# Patient Record
Sex: FEMALE | Race: WHITE
[De-identification: names, ages, dates, MRNs, and addresses within clinical notes are randomized per-mention and may not be internally consistent; named-entity substitution may affect disease eponyms.]

---

## 2018-01-06 ENCOUNTER — HOSPITAL ENCOUNTER (EMERGENCY)
Dept: HOSPITAL 75 - ER | Age: 28
Discharge: HOME | End: 2018-01-06
Payer: SELF-PAY

## 2018-01-06 VITALS — SYSTOLIC BLOOD PRESSURE: 124 MMHG | DIASTOLIC BLOOD PRESSURE: 67 MMHG

## 2018-01-06 VITALS — BODY MASS INDEX: 25.73 KG/M2 | HEIGHT: 66 IN | WEIGHT: 160.13 LBS

## 2018-01-06 DIAGNOSIS — O99.89: Primary | ICD-10-CM

## 2018-01-06 DIAGNOSIS — N89.8: ICD-10-CM

## 2018-01-06 DIAGNOSIS — Z3A.14: ICD-10-CM

## 2018-01-06 LAB
ALBUMIN SERPL-MCNC: 3.6 GM/DL (ref 3.2–4.5)
ALP SERPL-CCNC: 61 U/L (ref 40–136)
ALT SERPL-CCNC: 16 U/L (ref 0–55)
AMORPH SED URNS QL MICRO: (no result) /LPF
APTT PPP: YELLOW S
BACTERIA #/AREA URNS HPF: (no result) /HPF
BARBITURATES UR QL: NEGATIVE
BASOPHILS # BLD AUTO: 0 10^3/UL (ref 0–0.1)
BASOPHILS NFR BLD AUTO: 0 % (ref 0–10)
BENZODIAZ UR QL SCN: NEGATIVE
BILIRUB SERPL-MCNC: 0.3 MG/DL (ref 0.1–1)
BILIRUB UR QL STRIP: NEGATIVE
BUN/CREAT SERPL: 12
CALCIUM SERPL-MCNC: 9.2 MG/DL (ref 8.5–10.1)
CHLORIDE SERPL-SCNC: 109 MMOL/L (ref 98–107)
CO2 SERPL-SCNC: 18 MMOL/L (ref 21–32)
COCAINE UR QL: NEGATIVE
CREAT SERPL-MCNC: 0.75 MG/DL (ref 0.6–1.3)
EOSINOPHIL # BLD AUTO: 0 10^3/UL (ref 0–0.3)
EOSINOPHIL NFR BLD AUTO: 0 % (ref 0–10)
ERYTHROCYTE [DISTWIDTH] IN BLOOD BY AUTOMATED COUNT: 12.6 % (ref 10–14.5)
FIBRINOGEN PPP-MCNC: (no result) MG/DL
GFR SERPLBLD BASED ON 1.73 SQ M-ARVRAT: > 60 ML/MIN
GLUCOSE SERPL-MCNC: 91 MG/DL (ref 70–105)
GLUCOSE UR STRIP-MCNC: NEGATIVE MG/DL
HCT VFR BLD CALC: 32 % (ref 35–52)
HGB BLD-MCNC: 11.7 G/DL (ref 11.5–16)
KETONES UR QL STRIP: NEGATIVE
LEUKOCYTE ESTERASE UR QL STRIP: (no result)
LYMPHOCYTES # BLD AUTO: 1.7 X 10^3 (ref 1–4)
LYMPHOCYTES NFR BLD AUTO: 17 % (ref 12–44)
MANUAL DIFFERENTIAL PERFORMED BLD QL: NO
MCH RBC QN AUTO: 32 PG (ref 25–34)
MCHC RBC AUTO-ENTMCNC: 36 G/DL (ref 32–36)
MCV RBC AUTO: 88 FL (ref 80–99)
METHADONE UR QL SCN: NEGATIVE
METHAMPHETAMINE SCREEN URINE S: NEGATIVE
MONOCYTES # BLD AUTO: 0.5 X 10^3 (ref 0–1)
MONOCYTES NFR BLD AUTO: 5 % (ref 0–12)
NEUTROPHILS # BLD AUTO: 7.5 X 10^3 (ref 1.8–7.8)
NEUTROPHILS NFR BLD AUTO: 77 % (ref 42–75)
NITRITE UR QL STRIP: NEGATIVE
OPIATES UR QL SCN: NEGATIVE
OXYCODONE UR QL: NEGATIVE
PH UR STRIP: 8 [PH] (ref 5–9)
PLATELET # BLD: 332 10^3/UL (ref 130–400)
PMV BLD AUTO: 9 FL (ref 7.4–10.4)
POTASSIUM SERPL-SCNC: 4.1 MMOL/L (ref 3.6–5)
PROPOXYPH UR QL: NEGATIVE
PROT SERPL-MCNC: 6.5 GM/DL (ref 6.4–8.2)
PROT UR QL STRIP: NEGATIVE
RBC # BLD AUTO: 3.69 10^6/UL (ref 4.35–5.85)
RBC #/AREA URNS HPF: (no result) /HPF
SODIUM SERPL-SCNC: 138 MMOL/L (ref 135–145)
SP GR UR STRIP: 1.01 (ref 1.02–1.02)
SQUAMOUS #/AREA URNS HPF: (no result) /HPF
TRICYCLICS UR QL SCN: NEGATIVE
UROBILINOGEN UR-MCNC: NORMAL MG/DL
WBC # BLD AUTO: 9.7 10^3/UL (ref 4.3–11)
WBC #/AREA URNS HPF: (no result) /HPF

## 2018-01-06 PROCEDURE — 84702 CHORIONIC GONADOTROPIN TEST: CPT

## 2018-01-06 PROCEDURE — 87070 CULTURE OTHR SPECIMN AEROBIC: CPT

## 2018-01-06 PROCEDURE — 85025 COMPLETE CBC W/AUTO DIFF WBC: CPT

## 2018-01-06 PROCEDURE — 87491 CHLMYD TRACH DNA AMP PROBE: CPT

## 2018-01-06 PROCEDURE — 81000 URINALYSIS NONAUTO W/SCOPE: CPT

## 2018-01-06 PROCEDURE — 80053 COMPREHEN METABOLIC PANEL: CPT

## 2018-01-06 PROCEDURE — 80306 DRUG TEST PRSMV INSTRMNT: CPT

## 2018-01-06 PROCEDURE — 87591 N.GONORRHOEAE DNA AMP PROB: CPT

## 2018-01-06 PROCEDURE — 36415 COLL VENOUS BLD VENIPUNCTURE: CPT

## 2018-01-06 PROCEDURE — 99284 EMERGENCY DEPT VISIT MOD MDM: CPT

## 2018-01-06 PROCEDURE — 87210 SMEAR WET MOUNT SALINE/INK: CPT

## 2018-01-06 NOTE — ED GU-FEMALE
General


Chief Complaint:  -Female


Stated Complaint:  14 WKS PREG, DIZZY AND CRAMPS


Nursing Triage Note:  


states having yellow vaginal discharge and feels like she is unable to empty 


bladder times 2 days.  states pain was so bad today she "passed out" for a few 


minutes


Nursing Sepsis Screen:  No Definite Risk


Source:  patient


Exam Limitations:  no limitations





History of Present Illness


Time seen by provider:  17:40


Initial Comments


To ER with reports of being dizzy, abdominal cramping, nausea, urinary 

frequency but only dribbling a small amount when she goes. She is 14 weeks 

gestation.  Ab1. Denies vaginal bleeding. She also complains of a whitish 

yellow vaginal discharge


Timing/Duration:  constant, yesterday


Severity/Quality:  cramping


Location:  suprapubic


Activities at Onset:  none


Associated Symptoms:  dysuria





Allergies and Home Medications


Allergies


Coded Allergies:  


     No Known Drug Allergies (Unverified , 09)





Home Medications


Ferrous Sulfate 325 Mg Tablet, 325 MG PO DAILY, (Reported)


Ibuprofen 600 Mg Tab, 600 MG PO Q6H PRN, (Reported)


Prenatal Vits W-Ca,Fe,Fa(<1MG) 1 Each Tablet, 1 TAB PO DAILY, (Reported)





Constitutional:  see HPI


EENTM:  see HPI


Respiratory:  no symptoms reported


Cardiovascular:  no symptoms reported


Genitourinary:  see HPI


Musculoskeletal:  no symptoms reported


Skin:  no symptoms reported





Past Medical-Social-Family Hx


Patient Social History


Alcohol Use:  Denies Use


Recreational Drug Use:  No


Smoking Status:  Current Everyday Smoker


2nd Hand Smoke Exposure:  No


Recent Foreign Travel:  No


Contact w/Someone Who Travel:  No


Recent Infectious Disease Expo:  No


Recent Hopitalizations:  No (dental problems in )


Physical Abuse:  No


Sexual Abuse:  No


Mistreated:  No


Fear:  No





Immunizations Up To Date


Tetanus Booster (TDap):  More than 5yrs





Surgeries


History of Surgeries:  Yes


Surgeries:  Gallbladder





Respiratory


History of Respiratory Disorde:  No





Cardiovascular


History of Cardiac Disorders:  No





Neurological


History of Neurological Disord:  No





Reproductive System


Hx :  3


Hx Para:  4


Hx Reproductive Disorders:  Yes


Sexually Transmitted Disease:  Yes (chlamydia)





Genitourinary


History of Genitourinary Disor:  No





Gastrointestinal


History of Gastrointestinal Di:  No





Musculoskeletal


History of Musculoskeletal Dis:  No





Endocrine


History of Endocrine Disorders:  No





Psychosocial


History of Psychiatric Problem:  No


Suicide Risk Score:  0





Integumentary


History of Skin or Integumenta:  No





Blood Transfusions


History of Blood Disorders:  No





Physical Exam


Vital Signs





Vital Sign - Last 12Hours








 18





 17:27


 


Temp 97.0


 


Pulse 81


 


Resp 18


 


B/P (MAP) 125/72 (89)


 


Pulse Ox 99





Capillary Refill : Less Than 3 Seconds


General Appearance:  WD/WN, no apparent distress


HEENT:  PERRL/EOMI, normal ENT inspection


Neck:  non-tender, full range of motion


Respiratory:  normal breath sounds, no respiratory distress, no accessory 

muscle use


Gastrointestinal:  normal bowel sounds, non tender, soft


Pelvic:  other (pelvic exam done with Caitlin Hall RN are at the bedside. There 

is a moderate amount of purulent material coming from the cervix yellowish 

green in color.)


Extremities:  normal range of motion, non-tender


Neurologic/Psychiatric:  alert, normal mood/affect, oriented x 3


Skin:  normal color, warm/dry





Progress/Results/Core Measures


Suspected Sepsis


Recent Fever Within 48 Hours:  No


Infection Criteria Present:  None


New/Unexplained  Altered Menta:  No


Sepsis Screen:  No Definite Risk


Sepsis Diagnosis:  


SIRS


Temperature:97.0 


Pulse: 81 


Respiratory Rate: 18


 


Laboratory Tests


18 17:49: White Blood Count 9.7


Blood Pressure 125 /72 


Mean: 89


 





Laboratory Tests


18 17:49: 


Creatinine 0.75, Platelet Count 332, Total Bilirubin 0.3








Results/Orders


Lab Results





Laboratory Tests








Test


  18


17:27 18


17:49 18


18:45 Range/Units


 


 


Urine Color YELLOW     


 


Urine Clarity VERY CLOUDY H    


 


Urine pH 8    5-9  


 


Urine Specific Gravity 1.015 L   1.016-1.022  


 


Urine Protein NEGATIVE    NEGATIVE  


 


Urine Glucose (UA) NEGATIVE    NEGATIVE  


 


Urine Ketones NEGATIVE    NEGATIVE  


 


Urine Nitrite NEGATIVE    NEGATIVE  


 


Urine Bilirubin NEGATIVE    NEGATIVE  


 


Urine Urobilinogen NORMAL    NORMAL  MG/DL


 


Urine Leukocyte Esterase 1+ H   NEGATIVE  


 


Urine RBC (Auto) NEGATIVE    NEGATIVE  


 


Urine RBC NONE     /HPF


 


Urine WBC 0-2     /HPF


 


Urine Squamous Epithelial


Cells 2-5 


  


  


   /HPF


 


 


Urine Crystals PRESENT H    /LPF


 


Urine Amorphous Sediment


  LARGE KELLI


PHOSPHATE H 


  


   /LPF


 


 


Urine Bacteria NONE     /HPF


 


Urine Casts NONE     /LPF


 


Urine Mucus NEGATIVE     /LPF


 


Urine Culture Indicated NO     


 


Urine Opiates Screen NEGATIVE    NEGATIVE  


 


Urine Oxycodone Screen NEGATIVE    NEGATIVE  


 


Urine Methadone Screen NEGATIVE    NEGATIVE  


 


Urine Propoxyphene Screen NEGATIVE    NEGATIVE  


 


Urine Barbiturates Screen NEGATIVE    NEGATIVE  


 


Ur Tricyclic Antidepressants


Screen NEGATIVE 


  


  


  NEGATIVE  


 


 


Urine Phencyclidine Screen NEGATIVE    NEGATIVE  


 


Urine Amphetamines Screen NEGATIVE    NEGATIVE  


 


Urine Methamphetamines Screen NEGATIVE    NEGATIVE  


 


Urine Benzodiazepines Screen NEGATIVE    NEGATIVE  


 


Urine Cocaine Screen NEGATIVE    NEGATIVE  


 


Urine Cannabinoids Screen NEGATIVE    NEGATIVE  


 


White Blood Count


  


  9.7 


  


  4.3-11.0


10^3/uL


 


Red Blood Count


  


  3.69 L


  


  4.35-5.85


10^6/uL


 


Hemoglobin  11.7   11.5-16.0  G/DL


 


Hematocrit  32 L  35-52  %


 


Mean Corpuscular Volume  88   80-99  FL


 


Mean Corpuscular Hemoglobin  32   25-34  PG


 


Mean Corpuscular Hemoglobin


Concent 


  36 


  


  32-36  G/DL


 


 


Red Cell Distribution Width  12.6   10.0-14.5  %


 


Platelet Count


  


  332 


  


  130-400


10^3/uL


 


Mean Platelet Volume  9.0   7.4-10.4  FL


 


Neutrophils (%) (Auto)  77 H  42-75  %


 


Lymphocytes (%) (Auto)  17   12-44  %


 


Monocytes (%) (Auto)  5   0-12  %


 


Eosinophils (%) (Auto)  0   0-10  %


 


Basophils (%) (Auto)  0   0-10  %


 


Neutrophils # (Auto)  7.5   1.8-7.8  X 10^3


 


Lymphocytes # (Auto)  1.7   1.0-4.0  X 10^3


 


Monocytes # (Auto)  0.5   0.0-1.0  X 10^3


 


Eosinophils # (Auto)


  


  0.0 


  


  0.0-0.3


10^3/uL


 


Basophils # (Auto)


  


  0.0 


  


  0.0-0.1


10^3/uL


 


Sodium Level  138   135-145  MMOL/L


 


Potassium Level  4.1   3.6-5.0  MMOL/L


 


Chloride Level  109 H    MMOL/L


 


Carbon Dioxide Level  18 L  21-32  MMOL/L


 


Anion Gap  11   5-14  MMOL/L


 


Blood Urea Nitrogen  9   7-18  MG/DL


 


Creatinine


  


  0.75 


  


  0.60-1.30


MG/DL


 


Estimat Glomerular Filtration


Rate 


  > 60 


  


   


 


 


BUN/Creatinine Ratio  12    


 


Glucose Level  91     MG/DL


 


Calcium Level  9.2   8.5-10.1  MG/DL


 


Total Bilirubin  0.3   0.1-1.0  MG/DL


 


Aspartate Amino Transf


(AST/SGOT) 


  14 


  


  5-34  U/L


 


 


Alanine Aminotransferase


(ALT/SGPT) 


  16 


  


  0-55  U/L


 


 


Alkaline Phosphatase  61     U/L


 


Total Protein  6.5   6.4-8.2  GM/DL


 


Albumin  3.6   3.2-4.5  GM/DL


 


Human Chorionic Gonadotropin,


Quant 


  71117 H


  


  <5  MIU/ML


 








My Orders





Orders - RUBÉN ADAME


Cbc With Automated Diff (18 17:23)


Comprehensive Metabolic Panel (18 17:23)


Ua Culture If Indicated (18 17:23)


Drug Screen Stat (Urine) (18 17:23)


Hcg,Quantitative (18 17:23)


Wet Prep (18 18:15)


Neisseria Gonorrhea Dna (18 18:15)


Chlamydia Dna (18 18:15)


Genital Culture (18 18:15)





Vital Signs/I&O





Vital Sign - Last 12Hours








 18





 17:27


 


Temp 97.0


 


Pulse 81


 


Resp 18


 


B/P (MAP) 125/72 (89)


 


Pulse Ox 99





Capillary Refill : Less Than 3 Seconds








Blood Pressure Mean:  89








Progress Note :  


Progress Note


Intrauterine pregnancy seen at the bedside by me on bedside ultrasound. Fetal 

heart rate 154.





Departure


Impression


Impression:  


 Primary Impression:  


 Vaginal discharge during pregnancy


Disposition:  01 HOME, SELF-CARE


Condition:  Stable





Departure-Patient Inst.


Decision time for Depature:  17:42


Referrals:  


[facility] (PCP)


Primary Care Physician


Patient Instructions:  NO INSTRUCTIONS GIVEN





Add. Discharge Instructions:  


1. Follow-up with your obstetrician this week


2. Return to ER for concerns


3. All discharge instructions reviewed with patient and/or family. Voiced 

understanding.











RUBÉN ADAME 2018 17:42

## 2018-01-18 ENCOUNTER — HOSPITAL ENCOUNTER (OUTPATIENT)
Dept: HOSPITAL 75 - RAD | Age: 28
End: 2018-01-18
Attending: FAMILY MEDICINE
Payer: SELF-PAY

## 2018-01-18 DIAGNOSIS — Z3A.17: ICD-10-CM

## 2018-01-18 DIAGNOSIS — Z34.82: Primary | ICD-10-CM

## 2018-01-18 PROCEDURE — 76805 OB US >/= 14 WKS SNGL FETUS: CPT

## 2018-01-18 NOTE — DIAGNOSTIC IMAGING REPORT
INDICATION: Evaluation for size and dates.



TECHNIQUE: Multiple real-time grayscale images were obtained over

the gravid uterus.



COMPARISON: None.



FINDINGS: There is presence of an early viable intrauterine

pregnancy, currently in a breech presentation. Placenta is

anterior and without evidence for previa. Normal amount of

amniotic fluid appears to be present.



Cervical length is 4.7 cm.



Fetal anatomical assessment is not complete. No definitive

abnormality is suggested.



Biometrical measurements are as follows:

Biparietal 3.37 cm, age 16 weeks 4 days.

Head circumference 12.87 cm, age 16 weeks 4 days.

Abdominal circumference 11.62 cm, age 17 weeks 3 days.

Femur length 2.31 cm, age 17 weeks 0 days.



Sonographic estimate age: 17 weeks 0 days.

Sonographic estimated date of delivery: 06-28-18.



Estimated Fetal Weight: 181 gm (+/- 26  gm).

LMP percentile: unknown%.



Fetal heart rate: 152 beats per minute.



Fetal number: 1 of 1.



Maternal adnexa is not imaged.



IMPRESSION: Early viable intrauterine pregnancy with estimated

age at 17 weeks 0 days. Sonographic estimated date of delivery of

June 28, 2018.



Dictated by: 



  Dictated on workstation # [id number]

## 2018-03-06 ENCOUNTER — HOSPITAL ENCOUNTER (OUTPATIENT)
Dept: HOSPITAL 75 - RAD | Age: 28
End: 2018-03-06
Attending: FAMILY MEDICINE
Payer: MEDICAID

## 2018-03-06 DIAGNOSIS — Z34.82: Primary | ICD-10-CM

## 2018-03-06 DIAGNOSIS — Z3A.23: ICD-10-CM

## 2018-03-06 PROCEDURE — 76805 OB US >/= 14 WKS SNGL FETUS: CPT

## 2018-03-06 NOTE — DIAGNOSTIC IMAGING REPORT
INDICATION: Supervision of normal pregnancy.



TECHNIQUE: Multiple real-time grayscale images were obtained over

the gravid uterus.



COMPARISON: 01/18/2018.



FINDINGS: There is a single live fetus in a cephalic

presentation. The placenta is anterior. Cervical length is 5.2

cm. The amniotic fluid volume appears within normal limits. Fetal

survey demonstrates fetal kidneys, bladder, and stomach to be

unremarkable. The intracranial structures are unremarkable. There

is a four-chamber heart. Fetal heart rate was recorded at 143

beats per minute. There is a three-vessel cord with normal cord

insertion. The spine is unremarkable.



Biometrical measurements are as follows:

Biparietal 5.42 cm, age 22 weeks 4 days.

Head circumference 20.39 cm, age 22 weeks 4 days.

Abdominal circumference 18.63 cm, age 23 weeks 3 days.

Femur length 4.59 cm, age 25 weeks 2 days.



Sonographic estimate age: 23 weeks 4 days.

Sonographic estimated date of delivery: 6/29/2018.



Estimated Fetal Weight: 646 gm (+/- 94 gm).

LMP percentile: 81%.



Fetal heart rate: 143 beats per minute.



Fetal number: 1 of 1.



IMPRESSION: Single live IUP of approximately 23-24 weeks

gestational age demonstrating normal interval growth when

compared with prior exam from 01/18/2018.



Dictated by: 



  Dictated on workstation # [id number]

## 2018-04-16 ENCOUNTER — HOSPITAL ENCOUNTER (OUTPATIENT)
Dept: HOSPITAL 75 - WSO | Age: 28
Discharge: HOME | End: 2018-04-16
Attending: FAMILY MEDICINE
Payer: MEDICAID

## 2018-04-16 VITALS — SYSTOLIC BLOOD PRESSURE: 130 MMHG | DIASTOLIC BLOOD PRESSURE: 79 MMHG

## 2018-04-16 VITALS — HEIGHT: 67 IN | BODY MASS INDEX: 29.84 KG/M2 | WEIGHT: 190.13 LBS

## 2018-04-16 DIAGNOSIS — O36.8130: Primary | ICD-10-CM

## 2018-04-16 DIAGNOSIS — Z3A.30: ICD-10-CM

## 2018-04-16 LAB
AMORPH SED URNS QL MICRO: (no result) /LPF
APTT PPP: YELLOW S
BACTERIA #/AREA URNS HPF: (no result) /HPF
BILIRUB UR QL STRIP: NEGATIVE
FIBRINOGEN PPP-MCNC: CLEAR MG/DL
GLUCOSE UR STRIP-MCNC: NEGATIVE MG/DL
KETONES UR QL STRIP: NEGATIVE
LEUKOCYTE ESTERASE UR QL STRIP: (no result)
NITRITE UR QL STRIP: NEGATIVE
PH UR STRIP: 6 [PH] (ref 5–9)
PROT UR QL STRIP: NEGATIVE
RBC #/AREA URNS HPF: (no result) /HPF
SP GR UR STRIP: 1.01 (ref 1.02–1.02)
UROBILINOGEN UR-MCNC: NORMAL MG/DL

## 2018-04-16 PROCEDURE — 87088 URINE BACTERIA CULTURE: CPT

## 2018-04-16 PROCEDURE — 81000 URINALYSIS NONAUTO W/SCOPE: CPT

## 2018-04-16 PROCEDURE — 99212 OFFICE O/P EST SF 10 MIN: CPT

## 2018-04-17 NOTE — PHYSICIAN QUERY-FINAL DX
SKYLAR PINA 4/17/18 1150:


Clinic Account Progress/Dx


Physician Query:


Please give diagnosis


Date of Service





Apr 16, 2018 at 21:52





ANTELMO JOSEPH MD 4/18/18 0925:


Clinic Account Progress/Dx


DIAGNOSIS:


Diagnosis


30 week gestation


Decreased fetal movement with normal fetal heart tracing and good fetal 

movement while on Labor and Delivery











SKYLAR PINA Apr 17, 2018 11:50


ANTELMO JOSEPH MD Apr 18, 2018 09:25

## 2018-05-04 ENCOUNTER — HOSPITAL ENCOUNTER (OUTPATIENT)
Dept: HOSPITAL 75 - LDRP | Age: 28
Discharge: HOME | End: 2018-05-04
Attending: FAMILY MEDICINE
Payer: MEDICAID

## 2018-05-04 VITALS — HEIGHT: 67 IN | WEIGHT: 187.38 LBS | BODY MASS INDEX: 29.41 KG/M2

## 2018-05-04 VITALS — DIASTOLIC BLOOD PRESSURE: 76 MMHG | SYSTOLIC BLOOD PRESSURE: 126 MMHG

## 2018-05-04 DIAGNOSIS — O26.893: Primary | ICD-10-CM

## 2018-05-04 DIAGNOSIS — N88.8: ICD-10-CM

## 2018-05-04 DIAGNOSIS — Z3A.32: ICD-10-CM

## 2018-05-04 LAB
APTT PPP: YELLOW S
BACTERIA #/AREA URNS HPF: (no result) /HPF
BILIRUB UR QL STRIP: NEGATIVE
FIBRINOGEN PPP-MCNC: (no result) MG/DL
GLUCOSE UR STRIP-MCNC: NEGATIVE MG/DL
KETONES UR QL STRIP: (no result)
LEUKOCYTE ESTERASE UR QL STRIP: (no result)
NITRITE UR QL STRIP: NEGATIVE
PH UR STRIP: 6 [PH] (ref 5–9)
PROT UR QL STRIP: (no result)
RBC #/AREA URNS HPF: (no result) /HPF
SP GR UR STRIP: 1.03 (ref 1.02–1.02)
SQUAMOUS #/AREA URNS HPF: (no result) /HPF
UROBILINOGEN UR-MCNC: NORMAL MG/DL
WBC #/AREA URNS HPF: (no result) /HPF

## 2018-05-04 PROCEDURE — 36415 COLL VENOUS BLD VENIPUNCTURE: CPT

## 2018-05-04 PROCEDURE — 99214 OFFICE O/P EST MOD 30 MIN: CPT

## 2018-05-04 PROCEDURE — 81000 URINALYSIS NONAUTO W/SCOPE: CPT

## 2018-05-04 PROCEDURE — 89060 EXAM SYNOVIAL FLUID CRYSTALS: CPT

## 2018-05-04 PROCEDURE — 87088 URINE BACTERIA CULTURE: CPT

## 2018-05-07 NOTE — PHYSICIAN QUERY-FINAL DX
LARISSA BARTHOLOMEW 5/7/18 2008:


Clinic Account Progress/Dx


Physician Query:


Please give diagnosis


Date of Service





May 4, 2018 at 20:38





SUMMER MALDONADO DO 5/12/18 0935:


Clinic Account Progress/Dx


DIAGNOSIS:


Diagnosis


see note





Progress Note:


Send to Dr. Douglas, not my patient/I was not on call











LARISSA BARTHOLOMEW May 7, 2018 20:08


SUMMER MALDONADO DO May 12, 2018 09:35

## 2018-05-14 NOTE — PHYSICIAN QUERY-FINAL DX
JOSE GANN 5/14/18 0745:


Clinic Account Progress/Dx


Physician Query:


Please give diagnosis


Date of Service





May 4, 2018 at 20:38





Progress Note:


Jose  [phone]





KEENAN WOODALL MD 5/21/18 0728:


Clinic Account Progress/Dx


DIAGNOSIS:


Diagnosis


1.  IUP at 32 weeks gestation


2.  Passage of mucous (mucous plug possibly) from vagina


3.  Amniotic membranes intact


4.  non-labor











JOSE GANN May 14, 2018 07:45


KEENAN WOODALL MD May 21, 2018 07:28

## 2018-06-07 ENCOUNTER — HOSPITAL ENCOUNTER (OUTPATIENT)
Dept: HOSPITAL 75 - WSO | Age: 28
Discharge: HOME | End: 2018-06-07
Attending: FAMILY MEDICINE
Payer: MEDICAID

## 2018-06-07 VITALS — BODY MASS INDEX: 29.9 KG/M2 | HEIGHT: 66 IN | WEIGHT: 186.03 LBS

## 2018-06-07 DIAGNOSIS — R10.11: ICD-10-CM

## 2018-06-07 DIAGNOSIS — Z3A.37: ICD-10-CM

## 2018-06-07 DIAGNOSIS — O36.8130: Primary | ICD-10-CM

## 2018-06-07 DIAGNOSIS — O21.2: ICD-10-CM

## 2018-06-07 DIAGNOSIS — O47.1: ICD-10-CM

## 2018-06-07 LAB
ALBUMIN SERPL-MCNC: 3.2 GM/DL (ref 3.2–4.5)
ALP SERPL-CCNC: 114 U/L (ref 40–136)
ALT SERPL-CCNC: 13 U/L (ref 0–55)
APTT PPP: YELLOW S
BACTERIA #/AREA URNS HPF: NEGATIVE /HPF
BILIRUB SERPL-MCNC: 0.3 MG/DL (ref 0.1–1)
BILIRUB UR QL STRIP: NEGATIVE
BUN/CREAT SERPL: 10
CALCIUM SERPL-MCNC: 8.5 MG/DL (ref 8.5–10.1)
CHLORIDE SERPL-SCNC: 109 MMOL/L (ref 98–107)
CO2 SERPL-SCNC: 17 MMOL/L (ref 21–32)
CREAT SERPL-MCNC: 0.62 MG/DL (ref 0.6–1.3)
ERYTHROCYTE [DISTWIDTH] IN BLOOD BY AUTOMATED COUNT: 14.5 % (ref 10–14.5)
FIBRINOGEN PPP-MCNC: CLEAR MG/DL
GFR SERPLBLD BASED ON 1.73 SQ M-ARVRAT: > 60 ML/MIN
GLUCOSE SERPL-MCNC: 77 MG/DL (ref 70–105)
GLUCOSE UR STRIP-MCNC: NEGATIVE MG/DL
HCT VFR BLD CALC: 31 % (ref 35–52)
HGB BLD-MCNC: 10.4 G/DL (ref 11.5–16)
KETONES UR QL STRIP: NEGATIVE
LEUKOCYTE ESTERASE UR QL STRIP: (no result)
MCH RBC QN AUTO: 31 PG (ref 25–34)
MCHC RBC AUTO-ENTMCNC: 34 G/DL (ref 32–36)
MCV RBC AUTO: 92 FL (ref 80–99)
NITRITE UR QL STRIP: NEGATIVE
PH UR STRIP: 6 [PH] (ref 5–9)
PLATELET # BLD: 330 10^3/UL (ref 130–400)
PMV BLD AUTO: 8.5 FL (ref 7.4–10.4)
POTASSIUM SERPL-SCNC: 3.9 MMOL/L (ref 3.6–5)
PROT SERPL-MCNC: 6 GM/DL (ref 6.4–8.2)
PROT UR QL STRIP: NEGATIVE
RBC # BLD AUTO: 3.35 10^6/UL (ref 4.35–5.85)
RBC #/AREA URNS HPF: (no result) /HPF
SODIUM SERPL-SCNC: 137 MMOL/L (ref 135–145)
SP GR UR STRIP: 1.01 (ref 1.02–1.02)
SQUAMOUS #/AREA URNS HPF: (no result) /HPF
UROBILINOGEN UR-MCNC: NORMAL MG/DL
WBC # BLD AUTO: 9.1 10^3/UL (ref 4.3–11)
WBC #/AREA URNS HPF: (no result) /HPF

## 2018-06-07 PROCEDURE — 80053 COMPREHEN METABOLIC PANEL: CPT

## 2018-06-07 PROCEDURE — 36415 COLL VENOUS BLD VENIPUNCTURE: CPT

## 2018-06-07 PROCEDURE — 81000 URINALYSIS NONAUTO W/SCOPE: CPT

## 2018-06-07 PROCEDURE — 85027 COMPLETE CBC AUTOMATED: CPT

## 2018-06-07 PROCEDURE — 76805 OB US >/= 14 WKS SNGL FETUS: CPT

## 2018-06-07 PROCEDURE — 76819 FETAL BIOPHYS PROFIL W/O NST: CPT

## 2018-06-07 PROCEDURE — 99213 OFFICE O/P EST LOW 20 MIN: CPT

## 2018-06-07 NOTE — DIAGNOSTIC IMAGING REPORT
INDICATION: Right abdominal pain.



TECHNIQUE: Multiple real-time grayscale images were obtained over

the gravid uterus.



COMPARISON: 01/18/2018 and 03/06/2018.



FINDINGS:  The previous OB ultrasound exam of 03/06/2018 noted a

single live intrauterine fetus of approximately 23-24 weeks

gestation. On this exam, the fetus is again visualized. The fetus

is cephalic in presentation. Fetal heart motion is noted and a

rate of 134 bpm is recorded. There are no obvious fetal

abnormalities identified.



The fetal biophysical profile score is 8 out of 8 and within

normal limits.



The fetal growth parameters average 34 weeks 6 days +/- 3.5

weeks. The estimated fetal weight is in the 19th percentile. The

placenta is anterior and there is no previa. The amniotic fluid

volume is at the lowest end of normal with an FRANKIE of 8.0 cm

(normal 8-22 cm).



During the course of the exam, hydronephrosis of the maternal

right kidney was noted. Whether this is secondary to the

so-called "hydronephrosis of pregnancy" or whether there is

partial obstruction of the right collecting system is not

certain. Clinical follow-up is recommended.



IMPRESSION:

1. There is single live fetus of approximately 37 weeks gestation

+/- 3.5 weeks. The EDC remains June 28, 2018.

2. There are no obvious fetal abnormalities identified.

3. The fetal biophysical profile score is 8 out of 8 and within

normal limits.

4. The fetal growth parameters have progressed as expected since

the prior exam tending towards the low side of normal.

5. There is hydronephrosis of the maternal kidney on the right.

This is of uncertain etiology. Clinical follow-up is recommended.



Biometrical measurements are as follows:

Biparietal 8.7 cm, age 35 weeks 3 days.

Head circumference 32.5 cm, age 36 weeks 6 days.

Abdominal circumference 31.2 cm, age 35 weeks 1 days.

Femur length 7.0 cm, age 35 weeks 6 days.



Sonographic estimate age: 35 weeks 6 days.

Sonographic estimated date of delivery: 07/06/18.



Estimated Fetal Weight: 2693 gm (+/- 393  gm).

LMP percentile: 19%.



Fetal heart rate: 134 beats per minute.



Fetal number: 1 of 1.



Dictated by: 



  Dictated on workstation # [id number]

## 2018-06-11 NOTE — PHYSICIAN QUERY-FINAL DX
LARISSA BARTHOLOMEW 18 1543:


Clinic Account Progress/Dx


Physician Query:


Please give diagnosis


Date of Service





2018 at 14:12





RIVERA TRIPLETT DO 18 1752:


Clinic Account Progress/Dx


DIAGNOSIS:


Diagnosis


Supervision of other normal pregnancy, third trimester


Nausea and Vomiting in Pregnancy, third trimester


Right Upper Quadrant Abdominal Pain, third Trimester


Threatened  Labor


Decreased Fetal Movement











LARISSA BARTHOLOMEW 2018 15:43


RIVERA TRIPLETT DO 2018 17:52

## 2018-06-12 ENCOUNTER — HOSPITAL ENCOUNTER (OUTPATIENT)
Dept: HOSPITAL 75 - RAD | Age: 28
Discharge: HOME | End: 2018-06-12
Attending: FAMILY MEDICINE
Payer: MEDICAID

## 2018-06-12 VITALS — DIASTOLIC BLOOD PRESSURE: 62 MMHG | SYSTOLIC BLOOD PRESSURE: 110 MMHG

## 2018-06-12 DIAGNOSIS — O36.5930: Primary | ICD-10-CM

## 2018-06-12 DIAGNOSIS — O28.8: ICD-10-CM

## 2018-06-12 DIAGNOSIS — Z3A.00: ICD-10-CM

## 2018-06-12 PROCEDURE — 76819 FETAL BIOPHYS PROFIL W/O NST: CPT

## 2018-06-12 NOTE — DIAGNOSTIC IMAGING REPORT
INDICATION: Small for dates.



TECHNIQUE: Multiple real-time grayscale images were obtained over

the gravid uterus in various projections.  



FINDINGS: There is a single live fetus in a vertex presentation.

Fetal heart rate was recorded at 135 beats per minute. Placenta

is anterior. Amniotic fluid index is 11.3 cm. Overall biophysical

profile score is normal at 8 out of 8.



IMPRESSION: Biophysical profile score of 8 out of 8.



Dictated by: 



  Dictated on workstation # [id number]

## 2018-06-19 ENCOUNTER — HOSPITAL ENCOUNTER (INPATIENT)
Dept: HOSPITAL 75 - LDRP | Age: 28
LOS: 2 days | Discharge: HOME | End: 2018-06-21
Attending: FAMILY MEDICINE | Admitting: FAMILY MEDICINE
Payer: MEDICAID

## 2018-06-19 VITALS — SYSTOLIC BLOOD PRESSURE: 123 MMHG | DIASTOLIC BLOOD PRESSURE: 73 MMHG

## 2018-06-19 VITALS — DIASTOLIC BLOOD PRESSURE: 64 MMHG | SYSTOLIC BLOOD PRESSURE: 116 MMHG

## 2018-06-19 VITALS — SYSTOLIC BLOOD PRESSURE: 116 MMHG | DIASTOLIC BLOOD PRESSURE: 65 MMHG

## 2018-06-19 VITALS — DIASTOLIC BLOOD PRESSURE: 56 MMHG | SYSTOLIC BLOOD PRESSURE: 116 MMHG

## 2018-06-19 VITALS — DIASTOLIC BLOOD PRESSURE: 66 MMHG | SYSTOLIC BLOOD PRESSURE: 116 MMHG

## 2018-06-19 VITALS — SYSTOLIC BLOOD PRESSURE: 115 MMHG | DIASTOLIC BLOOD PRESSURE: 76 MMHG

## 2018-06-19 VITALS — SYSTOLIC BLOOD PRESSURE: 115 MMHG | DIASTOLIC BLOOD PRESSURE: 61 MMHG

## 2018-06-19 VITALS — SYSTOLIC BLOOD PRESSURE: 117 MMHG | DIASTOLIC BLOOD PRESSURE: 68 MMHG

## 2018-06-19 VITALS — DIASTOLIC BLOOD PRESSURE: 71 MMHG | SYSTOLIC BLOOD PRESSURE: 119 MMHG

## 2018-06-19 VITALS — DIASTOLIC BLOOD PRESSURE: 71 MMHG | SYSTOLIC BLOOD PRESSURE: 111 MMHG

## 2018-06-19 VITALS — DIASTOLIC BLOOD PRESSURE: 75 MMHG | SYSTOLIC BLOOD PRESSURE: 128 MMHG

## 2018-06-19 VITALS — DIASTOLIC BLOOD PRESSURE: 60 MMHG | SYSTOLIC BLOOD PRESSURE: 116 MMHG

## 2018-06-19 VITALS — DIASTOLIC BLOOD PRESSURE: 59 MMHG | SYSTOLIC BLOOD PRESSURE: 113 MMHG

## 2018-06-19 VITALS — HEIGHT: 67 IN | WEIGHT: 184 LBS | BODY MASS INDEX: 28.88 KG/M2

## 2018-06-19 VITALS — SYSTOLIC BLOOD PRESSURE: 141 MMHG | DIASTOLIC BLOOD PRESSURE: 60 MMHG

## 2018-06-19 VITALS — SYSTOLIC BLOOD PRESSURE: 122 MMHG | DIASTOLIC BLOOD PRESSURE: 73 MMHG

## 2018-06-19 VITALS — SYSTOLIC BLOOD PRESSURE: 117 MMHG | DIASTOLIC BLOOD PRESSURE: 76 MMHG

## 2018-06-19 VITALS — SYSTOLIC BLOOD PRESSURE: 122 MMHG | DIASTOLIC BLOOD PRESSURE: 66 MMHG

## 2018-06-19 VITALS — DIASTOLIC BLOOD PRESSURE: 72 MMHG | SYSTOLIC BLOOD PRESSURE: 117 MMHG

## 2018-06-19 VITALS — SYSTOLIC BLOOD PRESSURE: 135 MMHG | DIASTOLIC BLOOD PRESSURE: 73 MMHG

## 2018-06-19 VITALS — SYSTOLIC BLOOD PRESSURE: 108 MMHG | DIASTOLIC BLOOD PRESSURE: 61 MMHG

## 2018-06-19 VITALS — DIASTOLIC BLOOD PRESSURE: 72 MMHG | SYSTOLIC BLOOD PRESSURE: 125 MMHG

## 2018-06-19 VITALS — DIASTOLIC BLOOD PRESSURE: 61 MMHG | SYSTOLIC BLOOD PRESSURE: 104 MMHG

## 2018-06-19 VITALS — SYSTOLIC BLOOD PRESSURE: 122 MMHG | DIASTOLIC BLOOD PRESSURE: 75 MMHG

## 2018-06-19 VITALS — DIASTOLIC BLOOD PRESSURE: 77 MMHG | SYSTOLIC BLOOD PRESSURE: 142 MMHG

## 2018-06-19 VITALS — SYSTOLIC BLOOD PRESSURE: 122 MMHG | DIASTOLIC BLOOD PRESSURE: 71 MMHG

## 2018-06-19 VITALS — SYSTOLIC BLOOD PRESSURE: 118 MMHG | DIASTOLIC BLOOD PRESSURE: 77 MMHG

## 2018-06-19 VITALS — SYSTOLIC BLOOD PRESSURE: 104 MMHG | DIASTOLIC BLOOD PRESSURE: 58 MMHG

## 2018-06-19 VITALS — DIASTOLIC BLOOD PRESSURE: 58 MMHG | SYSTOLIC BLOOD PRESSURE: 126 MMHG

## 2018-06-19 VITALS — DIASTOLIC BLOOD PRESSURE: 71 MMHG | SYSTOLIC BLOOD PRESSURE: 120 MMHG

## 2018-06-19 VITALS — DIASTOLIC BLOOD PRESSURE: 70 MMHG | SYSTOLIC BLOOD PRESSURE: 124 MMHG

## 2018-06-19 DIAGNOSIS — Z3A.38: ICD-10-CM

## 2018-06-19 DIAGNOSIS — F17.210: ICD-10-CM

## 2018-06-19 DIAGNOSIS — D64.9: ICD-10-CM

## 2018-06-19 DIAGNOSIS — O99.333: ICD-10-CM

## 2018-06-19 DIAGNOSIS — O99.013: Primary | ICD-10-CM

## 2018-06-19 DIAGNOSIS — A56.2: ICD-10-CM

## 2018-06-19 DIAGNOSIS — O98.313: ICD-10-CM

## 2018-06-19 LAB
APTT PPP: YELLOW S
BACTERIA #/AREA URNS HPF: (no result) /HPF
BASOPHILS # BLD AUTO: 0 10^3/UL (ref 0–0.1)
BASOPHILS NFR BLD AUTO: 0 % (ref 0–10)
BILIRUB UR QL STRIP: NEGATIVE
EOSINOPHIL # BLD AUTO: 0 10^3/UL (ref 0–0.3)
EOSINOPHIL NFR BLD AUTO: 1 % (ref 0–10)
ERYTHROCYTE [DISTWIDTH] IN BLOOD BY AUTOMATED COUNT: 14.6 % (ref 10–14.5)
FIBRINOGEN PPP-MCNC: CLEAR MG/DL
GLUCOSE UR STRIP-MCNC: NEGATIVE MG/DL
HCT VFR BLD CALC: 31 % (ref 35–52)
HGB BLD-MCNC: 10.4 G/DL (ref 11.5–16)
KETONES UR QL STRIP: NEGATIVE
LEUKOCYTE ESTERASE UR QL STRIP: (no result)
LYMPHOCYTES # BLD AUTO: 2 X 10^3 (ref 1–4)
LYMPHOCYTES NFR BLD AUTO: 24 % (ref 12–44)
MANUAL DIFFERENTIAL PERFORMED BLD QL: NO
MCH RBC QN AUTO: 30 PG (ref 25–34)
MCHC RBC AUTO-ENTMCNC: 34 G/DL (ref 32–36)
MCV RBC AUTO: 90 FL (ref 80–99)
MONOCYTES # BLD AUTO: 0.5 X 10^3 (ref 0–1)
MONOCYTES NFR BLD AUTO: 6 % (ref 0–12)
NEUTROPHILS # BLD AUTO: 5.8 X 10^3 (ref 1.8–7.8)
NEUTROPHILS NFR BLD AUTO: 70 % (ref 42–75)
NITRITE UR QL STRIP: NEGATIVE
PH UR STRIP: 5 [PH] (ref 5–9)
PLATELET # BLD: 317 10^3/UL (ref 130–400)
PMV BLD AUTO: 8.7 FL (ref 7.4–10.4)
PROT UR QL STRIP: (no result)
RBC # BLD AUTO: 3.43 10^6/UL (ref 4.35–5.85)
RBC #/AREA URNS HPF: (no result) /HPF
SP GR UR STRIP: 1.02 (ref 1.02–1.02)
UROBILINOGEN UR-MCNC: NORMAL MG/DL
WBC # BLD AUTO: 8.4 10^3/UL (ref 4.3–11)
WBC #/AREA URNS HPF: (no result) /HPF

## 2018-06-19 PROCEDURE — 85025 COMPLETE CBC W/AUTO DIFF WBC: CPT

## 2018-06-19 PROCEDURE — 81000 URINALYSIS NONAUTO W/SCOPE: CPT

## 2018-06-19 PROCEDURE — 86901 BLOOD TYPING SEROLOGIC RH(D): CPT

## 2018-06-19 PROCEDURE — 36415 COLL VENOUS BLD VENIPUNCTURE: CPT

## 2018-06-19 PROCEDURE — 86850 RBC ANTIBODY SCREEN: CPT

## 2018-06-19 PROCEDURE — 86900 BLOOD TYPING SEROLOGIC ABO: CPT

## 2018-06-19 RX ADMIN — Medication SCH MLS/HR: at 13:04

## 2018-06-19 RX ADMIN — IBUPROFEN SCH MG: 600 TABLET ORAL at 19:56

## 2018-06-19 RX ADMIN — Medication SCH MLS/HR: at 19:30

## 2018-06-19 NOTE — XMS REPORT
[facility]

 Created on: 2018



Kalyani Ambrociotta

External Reference #: [id number]

: 1990

Sex: Female



Demographics







 Address  [address] [place]  [address]

 

 Preferred Language  Unknown

 

 Marital Status  Unknown

 

 Temple Affiliation  Unknown

 

 Race  Unknown

 

 Ethnic Group  Unknown





Author







 Author  RIVERA TRIPLETT

 

 Organization  [facility]

 

 Address  [address] [place]  [address]



 

 Phone  [phone]







Care Team Providers







 Care Team Member Name  Role  Phone

 

 RIVERA TRIPLETT  Unavailable  [phone]







PROBLEMS







 Type  Condition  ICD9-CM Code  PWH92-NF Code  Onset Dates  Condition Status  
SNOMED Code

 

 Problem  Pregnancy with history of infertility in third trimester     O09.03  
   Active  51446142

 

 Problem  Heartburn     R12     Active  95108136

 

 Problem  Small-for-dates fetus, third trimester     O36.5930     Active  
254350278

 

 Problem  Amniotic fluid index borderline low     O28.8     Active  64377642

 

 Problem  Encounter for supervision of other normal pregnancy, third trimester 
    Z34.83     Active  11482026

 

 Problem  Smoking (tobacco) complicating pregnancy, third trimester     O99.333
     Active  966177237

 

 Problem  Other specified pregnancy related conditions, third trimester     
O26.893     Active  128121841

 

 Problem  Nausea and vomiting during pregnancy     O21.9     Active  75880580







ALLERGIES







 Substance  Reaction  Event Type  Date  Status

 

 coconut  Unknown  Non Drug Allergy  28 Dec, 2017  Active







ENCOUNTERS







 Encounter  Location  Date  Diagnosis

 

 [facility]  [id number] [place] [address] [id number]     

 

 [facility]  [id number] [place] [address] [phone]    Encounter for supervision of normal pregnancy in multigravida in third 
trimester Z34.83 ; Supervision of pregnancy with history of infertility in 
third trimester O09.03 ; 37 weeks gestation of pregnancy Z3A.37 and Smoking (
tobacco) complicating pregnancy, third trimester O99.333

 

 [facility]  [id number] [place] [address] [id number]    Small-for-dates fetus, third trimester O36.5930 and Amniotic fluid index 
borderline low O28.8

 

 [facility]  [id number] [place] [address] [id number]     

 

 [address] [id number] [phone]    Encounter for supervision of other normal pregnancy, third trimester 
Z34.83 ; Pregnancy with history of infertility in third trimester O09.03 ; 
Smoking (tobacco) complicating pregnancy, third trimester O99.333 ; Other 
specified pregnancy related conditions, third trimester O26.893 ; Heartburn R12 
; Nausea and vomiting during pregnancy O21.9 and Encounter for  
screening for Streptococcus B Z36.85

 

 [address] [place] [address] [id number]  24 May, 
2018  Encounter for supervision of other normal pregnancy, third trimester 
Z34.83 ; Pregnancy with history of infertility in third trimester O09.03 ; 
Smoking (tobacco) complicating pregnancy, third trimester O99.333 ; Other 
specified pregnancy related conditions, third trimester O26.893 ; Heartburn R12 
; Nausea and vomiting during pregnancy O21.9 ; Other specified pregnancy 
related conditions, unspecified trimester O26.899 and Diarrhea, unspecified 
R19.7

 

 [address] [place] [address] [id number]  08 May, 
2018  32 weeks gestation of pregnancy Z3A.32 and Normal pregnancy in third 
trimester Z34.93

 

 [id number] [place] [address] [id number]  02 May, 
2018   

 

 [id number] [place] [address] [id number]    Acute cystitis during pregnancy in third trimester O23.13 ; CVA 
tenderness M54.9 ; Encounter for immunization Z23 and 30 weeks gestation of 
pregnancy Z3A.30

 

 [address] [place] [address] [id number]  10 Apr, 
2018  Encounter for supervision of other normal pregnancy, third trimester 
Z34.83 ; Smoking (tobacco) complicating pregnancy, third trimester O99.333 ; 
Pregnancy with history of infertility in third trimester O09.03 ; Other 
specified pregnancy related conditions, second trimester O26.892 and Heartburn 
R12

 

 [address] [place] [address] [id number]  27 Mar, 
2018  Pregnancy with history of infertility in third trimester O09.03 ; 
Encounter for supervision of other normal pregnancy, third trimester Z34.83 and 
Smoking (tobacco) complicating pregnancy, third trimester O99.333

 

 [id number] [place] [address] [phone]  08 Mar, 
2018  Pregnancy with history of infertility in second trimester O09.02 ; 
Pregnancy complicated by tobacco use in second trimester O99.332 ; Encounter 
for supervision of other normal pregnancy, second trimester Z34.82 ; Other 
specified pregnancy related conditions, second trimester O26.892 and Heartburn 
R12

 

 [id number] [place] [address] [id number]  08 2018  Encounter for supervision of other normal pregnancy, second trimester 
Z34.82 ; Pregnancy with history of infertility in second trimester O09.02 ; 
Pregnancy complicated by tobacco use in second trimester O99.332 and Nausea and 
vomiting during pregnancy prior to 22 weeks gestation O21.9

 

 [id number] [place] [address] [id number]    Encounter for supervision of other normal pregnancy, second trimester 
Z34.82

 

 [id number] [place] [address] [phone]    Encounter for supervision of other normal pregnancy, second trimester 
Z34.82 ; Pregnancy with history of infertility in second trimester O09.02 ; 
Pregnancy complicated by tobacco use in second trimester O99.332 and Nausea and 
vomiting during pregnancy prior to 22 weeks gestation O21.9

 

 [id number] [place] [address] [id number]  28 Dec, 
2017  Encounter for supervision of other normal pregnancy, first trimester 
Z34.81 ; Tobacco smoking affecting pregnancy in first trimester O99.331 and 
Nausea and vomiting during pregnancy prior to 22 weeks gestation O21.9

 

 [id number] [place] [address] [phone]    Visit for TB skin test Z11.1

 

 [facility]  [id number] [place] [address] 
[phone]  31 Mar, 2016  Dental examination Z01.20

 

 [facility]  [id number] [place] [address] [phone]
[phone]  19 Aug, 2011   

 

 [facility] [place] [facility]  [address] [id number] [place] [address]-
1560  16 Aug, 2011   

 

 [facility] [place] [facility]  [address] [id number] [place] [address]-
3352     

 

 [facility] [place] [facility]  [address] [id number] [place] [address]
2540  15 Tariq, 2011   

 

 [facility] [place] [facility]  [address] [id number] [place] [address]-
9814  16 May, 2011   

 

 [facility] [place] [facility]  [address] [id number] [place] [address]-
2162  12 May, 2011   

 

 [facility] [place] [facility]  [address] [id number] [place] [address]-
7113  10 May, 2011   

 

 [facility] [place] [facility]  [address] [id number] [place] [address]-
4497  31 Dec, 2010   

 

 [facility] [place] [facility]  [address] [id number] [place] [address]-
4439  01 Dec, 2009   

 

 [facility] [place] [facility]  [address] [id number] [place] [address]-
1229  10 Nov, 2009   

 

 [facility] [place] [facility]  [address] [id number] [place] [address]-
4836  10 Nov, 2009   

 

 [facility] [place] [facility]  [address] [id number] [place] [address]-
9280  05 2009   

 

 [facility] [place] [facility]  [address] [id number] [place] [address]-
7879  14 Oct, 2009   

 

 [facility] [place] [facility]  [address] [id number] [place] [address]-
586[address]  14 Oct, 2009   

 

 [facility] [place] [facility]  [address] [id number] [place] [address]-
6443  18 Aug, 2009   

 

 [facility] [place] [facility]  [address] [id number] [place] [address]-
8994     

 

 [facility] [place] [facility]  [address] [id number] [place] [address]-
6266     

 

 [facility]  [address] [facility] [id number] [address]-
3745  10 Apr, 2009   

 

 [facility]  [address] [id number] [address]
3036     

 

 [facility]  [address] [id number] [address]-
4[address]     

 

 [facility]  [address] [address]-
3843  15 Dec, 2008   

 

 [facility]  [address] [id number] [address]
4302  11 Dec, 2008   

 

 [facility]  [address] [id number] [address]
7232  01 Dec, 2008   







IMMUNIZATIONS

No Known Immunizations



SOCIAL HISTORY

Never Assessed



REASON FOR VISIT

OB-intake  Jer KHAN



PLAN OF CARE







 Activity  Details









  









 Follow Up  3 Weeks Reason:







VITAL SIGNS







 Height  67 in  2017

 

 Weight  171 lbs  2017

 

 Temperature  97.6 degrees Fahrenheit  2017

 

 Heart Rate  100 bpm  2017

 

 Respiratory Rate  19   2017

 

 BMI  26.782 kg/m2  2017

 

 Blood pressure systolic  124 mmHg  2017

 

 Blood pressure diastolic  70 mmHg  2017







MEDICATIONS







 Medication  Instructions  Dosage  Frequency  Start Date  End Date  Duration  
Status

 

 Zofran 4 MG  Orally 3 times a day  2 tablets  8h  28 Dec, 2017     30 day(s)  
Active

 

 Zofran 8 MG  Orally every 8 hours as needed  1 tablet     28 Dec, 2017     30 
day(s)  Active

 

 Prenatal 1                    Active







RESULTS







 Name  Result  Date  Reference Range

 

 URINE DRUG SCREEN (IN HOUSE)     2017   

 

 Lot #  [id number]      

 

 Exp date  2018      

 

 Control  +      

 

 COCAINE  neg      

 

 AMPH  neg      

 

 MTD  neg      

 

 THC  neg      

 

 OPIATE  neg      

 

 BENZO  neg      

 

 PCP  neg      

 

 BAR  neg      

 

 OXY  neg      

 

 MAMP  neg      

 

 TCA  neg      

 

 BUP  neg      

 

 MDMA  neg      







PROCEDURES







 Procedure  Date Ordered  Result  Body Site

 

 DRUG TEST PRSMV DIR OPT OBS  Dec 28, 2017      







INSTRUCTIONS





MEDICATIONS ADMINISTERED

No Known Medications



MEDICAL (GENERAL) HISTORY







 Type  Description  Date

 

 Surgical History  gallbladder removed  3/2014

 

 Surgical History  dental surgery  age 7

## 2018-06-19 NOTE — OB LABOR & DELIVERY RECORD
Vag Delivery Note


Vag Delivery Note


Date of Delivery: 18 





Preoperative Diagnosis: Michelle Anna  is a 27 yr old  at 38 1/7 

wks gestation who presented to office earlier today with PROM.  Admitted, 

augmented with pitocin and prepped for vaginal delivery





Postoperative Diagnosis: Same





Surgeon: RIVERA TRIPLETT 





Anesthesia: Epidural





Delivery Type: Spontaneous Vaginal Delivery





Findings: 


Viable female infant, apgars 7,9, weight 7 lb 3 oz


Lacerations: none


Intact placenta with 3 vessel cord. Nuchal cord x2 - reduced x1 after delivery 

of head, delivered through second loop, no body cord or shoulder dystocia


Cytotec 800 mcg placed for postpartum hemorrhage prophylaxis





Estimated Blood Loss: 150 ml





Complications: None





Condition: Stable





Description of Procedure:





The patient is a  who presented with PROM. She was admitted and informed 

consent was obtained. Her labor course was remarkable for augmentation with 

pitocin and epidural placement She progressed to complete dilatation and began 

to push. 





She was then set up for delivery. The infant's head was delivered 

atraumatically in the LOT position. The shoulders and remainder of the infant's 

body were then delivered without difficulty. Upon delivery the infant was 

placed on maternal abdomen and the cord was allowed to pulsate for >60 seconds. 

The cord was doubly clamped and cut and the infant was handed off to the 

pediatric staff. An intact placenta with 3-vessel cord delivered via Amalia 

and there was found to be minimal bleeding.~ Vigorous fundal massage was 

performed and the fundus was found to be firm. IV oxytocin was given and 800 

mcg cytotec placed. Examination of the vagina and perineum revealed an intact 

perineum. Following the repair, sponge, instrument and needle counts were 

correct. Mom and baby were both in stable condition in the labor suite.





Vitals - Labs


Vital Signs - I&O





Vital Signs








  Date Time  Temp Pulse Resp B/P (MAP) Pulse Ox O2 Delivery O2 Flow Rate FiO2


 


18 19:00   18   Room Air  


 


18 18:45   18   Room Air  


 


18 18:30  75 18 141/60 (87)  Room Air  


 


18 18:15  75 18 142/77 (98)  Room Air  


 


18 18:00  77 18 117/68 (84)  Room Air  


 


18 17:45  76 18 135/73 (93)  Room Air  


 


18 17:30  64 18 126/58 (80) 99 Room Air  


 


18 17:15  68 18 128/75 (92) 100 Room Air  


 


18 17:00  67 18 122/71 (88) 99 Room Air  


 


18 16:45  65 18 119/71 (87) 100 Room Air  


 


18 16:30  69 18 116/66 (83) 99 Room Air  


 


18 16:15  75 18 123/73 (90) 100 Room Air  


 


18 16:00  70 18 116/56 (76) 99 Room Air  


 


18 15:45  81 18 116/60 (78) 98 Room Air  


 


18 15:30  67 18 104/58 (73) 98 Room Air  


 


18 15:15  86 18 122/66 (84) 99 Room Air  


 


18 15:00  78 18  100 Room Air  


 


18 14:55  84 18 116/65 (82) 100 Room Air  


 


18 14:50  88 18 116/64 (81) 100 Room Air  


 


18 14:45  83 18 120/71 (87) 100 Room Air  


 


18 14:40  77 18 122/73 (89) 100 Room Air  


 


18 14:30 96.7 75 18 117/72 (87)  Room Air  


 


18 14:15  73 18 108/61 (77)  Room Air  


 


18 14:00  86 18 115/61 (79)  Room Air  


 


18 13:45  98 18 125/72 (89)  Room Air  


 


18 13:00  82 18 124/70 (88)  Room Air  


 


18 12:30 96.1 92 18 122/75 (91)  Room Air  











Labs


Laboratory Tests


18 13:00: 


Urine Color YELLOW, Urine Clarity CLEAR, Urine pH 5, Urine Specific Gravity 

1.025H, Urine Protein 1+H, Urine Glucose (UA) NEGATIVE, Urine Ketones NEGATIVE, 

Urine Nitrite NEGATIVE, Urine Bilirubin NEGATIVE, Urine Urobilinogen NORMAL, 

Urine Leukocyte Esterase 1+H, Urine RBC (Auto) 5+H, Urine RBC NONE, Urine WBC 2-

5, Urine Squamous Epithelial Cells 10-25H, Urine Crystals NONE, Urine Bacteria 

TRACE, Urine Casts NONE, Urine Mucus MODERATEH, Urine Culture Indicated NO


18 13:25: 


White Blood Count 8.4, Red Blood Count 3.43L, Hemoglobin 10.4L, Hematocrit 31L, 

Mean Corpuscular Volume 90, Mean Corpuscular Hemoglobin 30, Mean Corpuscular 

Hemoglobin Concent 34, Red Cell Distribution Width 14.6H, Platelet Count 317, 

Mean Platelet Volume 8.7, Neutrophils (%) (Auto) 70, Lymphocytes (%) (Auto) 24, 

Monocytes (%) (Auto) 6, Eosinophils (%) (Auto) 1, Basophils (%) (Auto) 0, 

Neutrophils # (Auto) 5.8, Lymphocytes # (Auto) 2.0, Monocytes # (Auto) 0.5, 

Eosinophils # (Auto) 0.0, Basophils # (Auto) 0.0











RIVERA TRIPLETT DO 2018 20:22

## 2018-06-19 NOTE — HISTORY & PHYSICAL-OB
OB - Chief Complaint & HPI


Date/Time


Date of Admission:


Date of Admission:  2018 at 12:10


Time Seen by Provider:  17:00





Chief Complaint/History


OB-Reason for Admission/Chief:  Rupture of Membranes


Hx :  4


Hx Para:  2


Hx Last Menstrual Period:  2017


Expected Date of Delivery:  2018


Gestational Age in Weeks:  38


Gestational Age in Days:  1


Admission Nurse Assessment Rev:  Yes





Allergies and Home Medications


Allergies


Coded Allergies:  


     No Known Drug Allergies (Unverified , 09)





Home Medications


Acetaminophen 325 Mg Tablet, 325 MG PO PRN, (Reported)


Prenatal Vits W-Ca,Fe,Fa(<1MG) 1 Each Tablet, 1 TAB PO DAILY, (Reported)


Ranitidine HCl 150 Mg Tablet, 150 MG PO BID, (Reported)





Patient Home Medication List


Home Medication List Reviewed:  Yes





OB - History


Hx of Present Pregnancy


Prenatal Care:  Yes


Ultrasounds:  Normal mid trimester US


Obstetrical Complications:  None


Medical Complications:  None





Prenatal Information


Pregnancy Induced Hypertension:  No


Maternal Gestational Diabetes:  No


Postpartum Hemorrhage:  No





Obstetrical History


Hx :  4


Hx Para:  2


Hx # Term Pregnancies:  2


Hx #  Pregnancies:  0


Number of Living Children:  2


Hx Pregnancy Termination:  Yes


Hx Total # of Abortions (Spona:  0


Hx Multiple Gestation:  No


Hx Ectopic Pregnancy:  No


Hx Stillbirth:  No


Hx Pregnancy Complication:  No


Hx Pregnancy Induced Hypertens:  No


Hx Maternal Gestational Diabet:  No


Hx Postpartum Hemorrhage:  No





Delivery History


Hx Dystocia:  No


Hx Forceps Assisted Delivery:  No


Hx Vacuum Extraction Assisted:  No


Hx Placenta Abnormality:  No


Hx Fetal Distress:  No


Hx Large For Gestational Age I:  No


Hx Small for Gestational Age I:  No


Hx  Section:  No


Hx Vaginal Delivery Post C-Sec:  No


Hx Blood Disorders:  No


Adverse Rxn to Tranfusion:  No





Patient Past Medical History





Secondary Infertility


Tobacco Abuse


Spontaneous Term Vaginal Delivery x2


Elective Termination of Pregnancy


Anemia


History of Chlamydia





Social History/Family History


HIV/AIDS:  No


Recent Infectious Disease Expo:  No


Sexually Transmitted Disease:  Yes (chlamydia)


Alcohol Use:  Denies Use


Recreational Drug Use:  No


Smoking Cessation:  Current every day smoker


2nd Hand Smoke Exposure:  No





Immunizations


Tetanus Booster (TDap):  Less than 5yrs (18)


Rubella:  immune


RPR/VDRL:  Negative


GBS Status:  Negative


HBsAG:  Negative





OB - Admission Exam


Physical Exam


Vitals:





Vital Signs








 18





 14:30 17:30 18:30 19:00


 


Temp 96.7   


 


Pulse   75 


 


Resp    18


 


B/P (MAP)   141/60 (87) 


 


Pulse Ox  99  


 


O2 Delivery    Room Air








HEENT:  NCAT


Heart:  Rhythm Normal


Lungs:  Clear


Abdomen:  Gravid


Extremities:  Normal


Reflexes:  Normal


Cervical Dilatation:  6cm


Effacement:  75%


Station:  0


Membranes:  Ruptured


Amniotic Fluid:  Clear


Fetal Heart Rate:  120's


Accelerations:  Accelerations Present


Long Term Variability:  Average (6-25)


Contractions on Admission:  < 5 Minutes Apart


Date/Time Contractions Began;:  18


Frequency of Contractions:  irregular


Duration:  30+


Intensity:  Mild


Labs





Laboratory Tests








Test


 18


13:00 18


13:25 Range/Units


 


 


Urine Color YELLOW    


 


Urine Clarity CLEAR    


 


Urine pH 5   5-9  


 


Urine Specific Gravity 1.025 H  1.016-1.022  


 


Urine Protein 1+ H  NEGATIVE  


 


Urine Glucose (UA) NEGATIVE   NEGATIVE  


 


Urine Ketones NEGATIVE   NEGATIVE  


 


Urine Nitrite NEGATIVE   NEGATIVE  


 


Urine Bilirubin NEGATIVE   NEGATIVE  


 


Urine Urobilinogen NORMAL   NORMAL  MG/DL


 


Urine Leukocyte Esterase 1+ H  NEGATIVE  


 


Urine RBC (Auto) 5+ H  NEGATIVE  


 


Urine RBC NONE    /HPF


 


Urine WBC 2-5    /HPF


 


Urine Squamous Epithelial


Cells 10-25 H


 


  /HPF





 


Urine Crystals NONE    /LPF


 


Urine Bacteria TRACE    /HPF


 


Urine Casts NONE    /LPF


 


Urine Mucus MODERATE H   /LPF


 


Urine Culture Indicated NO    


 


White Blood Count


 


 8.4 


 4.3-11.0


10^3/uL


 


Red Blood Count


 


 3.43 L


 4.35-5.85


10^6/uL


 


Hemoglobin  10.4 L 11.5-16.0  G/DL


 


Hematocrit  31 L 35-52  %


 


Mean Corpuscular Volume  90  80-99  FL


 


Mean Corpuscular Hemoglobin  30  25-34  PG


 


Mean Corpuscular Hemoglobin


Concent 


 34 


 32-36  G/DL





 


Red Cell Distribution Width  14.6 H 10.0-14.5  %


 


Platelet Count


 


 317 


 130-400


10^3/uL


 


Mean Platelet Volume  8.7  7.4-10.4  FL


 


Neutrophils (%) (Auto)  70  42-75  %


 


Lymphocytes (%) (Auto)  24  12-44  %


 


Monocytes (%) (Auto)  6  0-12  %


 


Eosinophils (%) (Auto)  1  0-10  %


 


Basophils (%) (Auto)  0  0-10  %


 


Neutrophils # (Auto)  5.8  1.8-7.8  X 10^3


 


Lymphocytes # (Auto)  2.0  1.0-4.0  X 10^3


 


Monocytes # (Auto)  0.5  0.0-1.0  X 10^3


 


Eosinophils # (Auto)


 


 0.0 


 0.0-0.3


10^3/uL


 


Basophils # (Auto)


 


 0.0 


 0.0-0.1


10^3/uL











OB - Assessment/Plan/Diagnosis


Assessment


Assessment:  rupture of membranes


Admission Dx


Premature Rupture of Membranes


Term Pregnancy 


38 weeks gestation


Hx Infertility


Pregnancy complicated by Tobacco Abuse


Admission Status:  Inpatient Order (span 2 midnights)


Reason for Inpatient Admission:  


Delivery





Plan


Plan:  Other (Augementation)


Other Plan


Augmentation with pitocin per protocol.  Epidural when desired.  Anticipate 

vaginal delivery.











RIVERA TRIPLETT DO 2018 19:58

## 2018-06-19 NOTE — OB LABOR & DELIVERY RECORD
L&D History


Date of Service


Date of Service:  2018





History


Expected Date of Delivery:  2018


Gestational Age in Weeks:  38


Hx :  4


Hx Para:  2





Pregnancy Complications


Prenatal Events:  Prematre Rupture Membrane, Routine Prenatal care


Operative Indications (Cesarea:  N/A-Vaginal Delivery


Intrapartal Events:  None





L&D Stage1


Stage One


Onset of Labor - Date:  2018


Onset of Labor - Time:  13:00


Duration - Stage I:  5 hours





Monitors and Tracing


Fetal Monitor Mode:  External


Fetal Heart Rate:  120


Fetal Monitor Decelerations:  Variable


Fetal Station:  -2


Long Term Variability:  Average (6-10)


Fetal Presentation:  Vertex


Vital Signs





 VS - Last 72 Hours, by Label








 18





 12:30 13:00 13:45 14:00


 


Temp 96.1   


 


Pulse 92 82 98 86


 


Resp 18 18 18 18


 


B/P (MAP) 122/75 (91) 124/70 (88) 125/72 (89) 115/61 (79)


 


O2 Delivery Room Air Room Air Room Air Room Air


 


    





 18





 14:15 14:30 14:40 14:45


 


Temp  96.7  


 


Pulse 73 75 77 83


 


Resp 18 18 18 18


 


B/P (MAP) 108/61 (77) 117/72 (87) 122/73 (89) 120/71 (87)


 


Pulse Ox   100 100


 


O2 Delivery Room Air Room Air Room Air Room Air


 


    





 18





 14:50 14:55 15:00 15:15


 


Pulse 88 84 78 86


 


Resp 18 18 18 18


 


B/P (MAP) 116/64 (81) 116/65 (82)  122/66 (84)


 


Pulse Ox 100 100 100 99


 


O2 Delivery Room Air Room Air Room Air Room Air





 18





 15:30 15:45 16:00 16:15


 


Pulse 67 81 70 75


 


Resp 18 18 18 18


 


B/P (MAP) 104/58 (73) 116/60 (78) 116/56 (76) 123/73 (90)


 


Pulse Ox 98 98 99 100


 


O2 Delivery Room Air Room Air Room Air Room Air





 18





 16:30 16:45 17:00 17:15


 


Pulse 69 65 67 68


 


Resp 18 18 18 18


 


B/P (MAP) 116/66 (83) 119/71 (87) 122/71 (88) 128/75 (92)


 


Pulse Ox 99 100 99 100


 


O2 Delivery Room Air Room Air Room Air Room Air





 18





 17:30 17:45 18:00 18:15


 


Pulse 64 76 77 75


 


Resp 18 18 18 18


 


B/P (MAP) 126/58 (80) 135/73 (93) 117/68 (84) 142/77 (98)


 


Pulse Ox 99   


 


O2 Delivery Room Air Room Air Room Air Room Air





 18 





 18:30 18:45 19:00 


 


Pulse 75   


 


Resp 18 18 18 


 


B/P (MAP) 141/60 (87)   


 


O2 Delivery Room Air Room Air Room Air 











Rupture of Membranes


Spontaneous Ruture of Membrane:  Yes


Amniotic Membrane Rupture Time:  0500


Amniotic Membrane Fluid Desc.:  Clear


Amniotic Fluid Membrane Tests:  Nitrazine Positive (in office)


Vaginal Bleeding Description:  Normal Show





Induction/Anesthesia


Epidural Cath Placement - Time:  1446





Progress/Notes


forebag ruptured with amnihook 1707, clear fluid noted.





L&D Stage2


Stage Two


Stage II Date:  2018


Stage II Time:  18:00


Stage II Duration:  1 hour 2 min





Monitors and Tracing


Fetal Monitor Mode:  External


Fetal Heart Rate:  120


Fetal Monitor Accelerations:  Non-Uniform


Fetal Monitor Decelerations:  Variable


Long Term Variability:  Average (6-10)


Fetal Position:  Left Occiput Posterior


Fetal Presentation:  Vertex





Cord Descript/Complications


Fetal Cord Vessel Description:  3 Vessels


Complications


Nuchal x2





Delivery Type


Infant Delivery Method:  Spontaneous Vaginal


Anterior Shoulder:  Left





Episiotomy/Perineal Laceration


Laceraction(s)/Extensions:  No


Episiotomy Description:  None





Condition of Infant


Delivery


Delivery Date & Time:  18 at 1902


1 minute Apgar Comment:  


7


5 minute Apgar Comment:  


9





Condition of Infant


Condition of Infant:  Living


Exam:  No Observed Abnormalities





Resuscitation


Resuscitation:  N/A - Spontaneous Resp





L&D Stage3


Stage Three


Stage III Date:  2018


Stage III Time:  19:02


Stage III Duration:  5 min





Pictocin


Pitocin Administration mu/min:  20


Pitocin ml/hr:  20


Pitocin Administration Comment:  


pitocin increased


cytotec 800 mcg for bleeding





Placenta Delivery


Placenta Delivery:  Spontaneous





Delivery Summary


Summary


Total Labor Time


6 hours 7 min


Estimated blood loss (mL):  150


Attending at delivery:


Dr. Steinberg





Condition of Delivery


Examined:  Cervix Examined, Uterus Explored


Post Partum Hemorrhage:  No


Intervention Required


none


Condition of Mother


stable


Condition of Infant (s)


RIVERA Schwartz DO 2018 20:16

## 2018-06-20 VITALS — DIASTOLIC BLOOD PRESSURE: 79 MMHG | SYSTOLIC BLOOD PRESSURE: 119 MMHG

## 2018-06-20 VITALS — DIASTOLIC BLOOD PRESSURE: 64 MMHG | SYSTOLIC BLOOD PRESSURE: 118 MMHG

## 2018-06-20 VITALS — SYSTOLIC BLOOD PRESSURE: 119 MMHG | DIASTOLIC BLOOD PRESSURE: 61 MMHG

## 2018-06-20 VITALS — SYSTOLIC BLOOD PRESSURE: 117 MMHG | DIASTOLIC BLOOD PRESSURE: 70 MMHG

## 2018-06-20 VITALS — SYSTOLIC BLOOD PRESSURE: 116 MMHG | DIASTOLIC BLOOD PRESSURE: 57 MMHG

## 2018-06-20 LAB
BASOPHILS # BLD AUTO: 0 10^3/UL (ref 0–0.1)
BASOPHILS NFR BLD AUTO: 0 % (ref 0–10)
EOSINOPHIL # BLD AUTO: 0.1 10^3/UL (ref 0–0.3)
EOSINOPHIL NFR BLD AUTO: 1 % (ref 0–10)
ERYTHROCYTE [DISTWIDTH] IN BLOOD BY AUTOMATED COUNT: 14.2 % (ref 10–14.5)
HCT VFR BLD CALC: 29 % (ref 35–52)
HGB BLD-MCNC: 9.7 G/DL (ref 11.5–16)
LYMPHOCYTES # BLD AUTO: 2.2 X 10^3 (ref 1–4)
LYMPHOCYTES NFR BLD AUTO: 19 % (ref 12–44)
MANUAL DIFFERENTIAL PERFORMED BLD QL: NO
MCH RBC QN AUTO: 31 PG (ref 25–34)
MCHC RBC AUTO-ENTMCNC: 34 G/DL (ref 32–36)
MCV RBC AUTO: 91 FL (ref 80–99)
MONOCYTES # BLD AUTO: 0.8 X 10^3 (ref 0–1)
MONOCYTES NFR BLD AUTO: 7 % (ref 0–12)
NEUTROPHILS # BLD AUTO: 8.1 X 10^3 (ref 1.8–7.8)
NEUTROPHILS NFR BLD AUTO: 73 % (ref 42–75)
PLATELET # BLD: 269 10^3/UL (ref 130–400)
PMV BLD AUTO: 9.3 FL (ref 7.4–10.4)
RBC # BLD AUTO: 3.13 10^6/UL (ref 4.35–5.85)
WBC # BLD AUTO: 11.2 10^3/UL (ref 4.3–11)

## 2018-06-20 RX ADMIN — IBUPROFEN SCH MG: 600 TABLET ORAL at 04:12

## 2018-06-20 RX ADMIN — VITAMIN A ACETATE, .BETA.-CAROTENE, ASCORBIC ACID, CHOLECALCIFEROL, .ALPHA.-TOCOPHEROL ACETATE, DL-, THIAMINE MONONITRATE, RIBOFLAVIN, NIACINAMIDE, PYRIDOXINE HYDROCHLORIDE, FOLIC ACID, CYANOCOBALAMIN, CALCIUM CARBONATE, FERROUS FUMARATE, ZINC OXIDE, AND CUPRIC OXIDE SCH EA: 2000; 2000; 120; 400; 22; 1.84; 3; 20; 10; 1; 12; 200; 27; 25; 2 TABLET ORAL at 10:55

## 2018-06-20 RX ADMIN — IBUPROFEN SCH MG: 600 TABLET ORAL at 10:55

## 2018-06-20 RX ADMIN — DOCUSATE SODIUM SCH MG: 100 CAPSULE ORAL at 04:12

## 2018-06-20 RX ADMIN — IBUPROFEN SCH MG: 600 TABLET ORAL at 17:40

## 2018-06-20 RX ADMIN — DOCUSATE SODIUM SCH MG: 100 CAPSULE ORAL at 10:55

## 2018-06-20 NOTE — PROGRESS NOTE (SOAP)
Subjective


Subjective/Events-last exam


Afebrile, no acute events. Denies shortness of breath or dizziness. Minimal 

lochia, pain controlled. Breastfeeding okay, slow to latch.


Review of Systems


Date Seen by Provider:  2018


Time Seen by Provider:  12:55





Objective


Exam


Last Set of Vital Signs





Vital Signs








  Date Time  Temp Pulse Resp B/P (MAP) Pulse Ox O2 Delivery O2 Flow Rate FiO2


 


18 10:55 98.1 74 18 116/57 (76) 98 Room Air  





Capillary Refill :


General:  Alert, No Acute Distress


Lungs:  Clear to Auscultation, Normal Air Movement


Heart:  Regular Rate, No Murmurs


Abdomen:  Other (fundus firm below umbilicus, appropriately tender)


Neuro:  Normal Speech


Psych/Mental Status:  Mental Status NL





Results/Procedures


Lab


Laboratory Tests


18 05:32: 


White Blood Count 11.2H, Red Blood Count 3.13L, Hemoglobin 9.7L, Hematocrit 29L

, Mean Corpuscular Volume 91, Mean Corpuscular Hemoglobin 31, Mean Corpuscular 

Hemoglobin Concent 34, Red Cell Distribution Width 14.2, Platelet Count 269, 

Mean Platelet Volume 9.3, Neutrophils (%) (Auto) 73, Lymphocytes (%) (Auto) 19, 

Monocytes (%) (Auto) 7, Eosinophils (%) (Auto) 1, Basophils (%) (Auto) 0, 

Neutrophils # (Auto) 8.1H, Lymphocytes # (Auto) 2.2, Monocytes # (Auto) 0.8, 

Eosinophils # (Auto) 0.1, Basophils # (Auto) 0.0





Assessment/Plan


Assessment/Plan





(1) Spontaneous vaginal delivery


Status:  Acute


Assessment & Plan:  Routine postpartum care





(2) Postpartum anemia


Status:  Acute


Assessment & Plan:  Asymptomatic, hgb 9.7, start iron daily








Clinical Quality Measures


DVT/VTE Risk/Contraindication:


Risk Factor Score Per Nursin


RFS Level Per Nursing on Admit:  2=Moderate











ANTELMO JOSEPH MD 2018 2:51 pm

## 2018-06-21 VITALS — SYSTOLIC BLOOD PRESSURE: 128 MMHG | DIASTOLIC BLOOD PRESSURE: 85 MMHG

## 2018-06-21 VITALS — DIASTOLIC BLOOD PRESSURE: 62 MMHG | SYSTOLIC BLOOD PRESSURE: 104 MMHG

## 2018-06-21 VITALS — DIASTOLIC BLOOD PRESSURE: 71 MMHG | SYSTOLIC BLOOD PRESSURE: 114 MMHG

## 2018-06-21 VITALS — SYSTOLIC BLOOD PRESSURE: 129 MMHG | DIASTOLIC BLOOD PRESSURE: 83 MMHG

## 2018-06-21 RX ADMIN — DOCUSATE SODIUM SCH MG: 100 CAPSULE ORAL at 08:53

## 2018-06-21 RX ADMIN — VITAMIN A ACETATE, .BETA.-CAROTENE, ASCORBIC ACID, CHOLECALCIFEROL, .ALPHA.-TOCOPHEROL ACETATE, DL-, THIAMINE MONONITRATE, RIBOFLAVIN, NIACINAMIDE, PYRIDOXINE HYDROCHLORIDE, FOLIC ACID, CYANOCOBALAMIN, CALCIUM CARBONATE, FERROUS FUMARATE, ZINC OXIDE, AND CUPRIC OXIDE SCH EA: 2000; 2000; 120; 400; 22; 1.84; 3; 20; 10; 1; 12; 200; 27; 25; 2 TABLET ORAL at 08:53

## 2018-06-21 RX ADMIN — IBUPROFEN SCH MG: 600 TABLET ORAL at 00:40

## 2018-06-21 RX ADMIN — IBUPROFEN SCH MG: 600 TABLET ORAL at 08:54

## 2018-06-21 RX ADMIN — DOCUSATE SODIUM SCH MG: 100 CAPSULE ORAL at 12:48

## 2018-06-21 RX ADMIN — IBUPROFEN SCH MG: 600 TABLET ORAL at 14:34

## 2018-06-21 RX ADMIN — IBUPROFEN SCH MG: 600 TABLET ORAL at 06:20

## 2018-06-21 NOTE — DISCHARGE INSTRUCTIONS
Discharge Inst-[facility]


Depart Medications


New, Converted or Re-Newed RX:  RX on Chart


New Medications:  


Docusate Sodium (Docusate Sodium) 100 Mg Capsule


100 MG PO BID, #60 CAP 0 Refills





Ferrous Sulfate (Ferrous Sulfate) 325 Mg Tablet


325 MG PO DAILY@0700, #30 TAB 0 Refills





Ibuprofen (Ibu) 600 Mg Tablet


600 MG PO Q6H, #90 TAB 0 Refills





 


Continued Medications:  


Acetaminophen (Tylenol) 325 Mg Tablet


325 MG PO PRN, TAB





Prenatal Vits W-Ca,Fe,Fa(<1MG) (Prenatal) 1 Each Tablet


1 TAB PO DAILY





 


Discontinued Medications:  


Ranitidine HCl (Zantac) 150 Mg Tablet


150 MG PO BID, TAB











Follow Up/Instructions


Goal/Follow Up:  


Follow up with Dr. Triplett in 6 weeks for postpartum visit.





Activity


Activity:  Activity as Tolerated (avoid strenuous activity x 6 weeks)


Driving Instructions:  You May Drive


NO SMOKING:  NO SMOKING


Nothing Inside Vagina:  No Douching, No [place], No Tampons





Diet


Discharge Diet:  Regular Diet


Symptoms to Report to :  Swelling Increased, Bleeding Excessive, Fever Over 

101 Degrees F, Pain/Pressure in Chest, Vaginal Bleeding Increase, Cramps in 

Feet or Legs, Vaginal Discharge Foul, Dizziness/Fainting, Shortness of Breath


For Any Problems or Questions:  Contact Your Physician


Copies To 1:   RIVERA TRIPLETT BETHANY N MD Jun 20, 2018 2:55 pm

## 2018-06-21 NOTE — ANESTHESIA-REGIONAL POST-OP
Regional


Patient Condition


Mental Status:  Alert, Oriented x3


Circulation:  Same as Pre-Op


Headache:  Absent


Sensation:  Full Recovery


Motor Block:  Absent





Post Op Complications


Complications


None





Follow Up Care/Instructions


Patient Instructions


None needed.





Anesthesia/Patient Condition


Patient is doing well, no complaints, stable vital signs, no apparent adverse 

anesthesia problems.   


No complications reported per nursing.











RUSTY BILLINGSLEY CRNA Jun 21, 2018 13:28

## 2022-04-07 NOTE — DISCHARGE SUMMARY
Diagnosis/Chief Complaint


Date of Admission


2018 at 12:10 pm


Date of Discharge


2018


Admission Diagnosis


Admission Diagnosis


Term intrauterine pregnancy


SROM


Blood type A positive





Discharge Diagnosis


s/p spontaneous vaginal delivery


asymptomatic postpartum anemia- started iron sulfate daily





Chief Complaint/HPI


Chief Complaint/HPI


OB-Reason for Admission/Chief:  Rupture of Membranes


Hx :  4


Hx Para:  2


Hx Last Menstrual Period:  2017


Expected Date of Delivery:  2018


Gestational Age in Weeks:  40


Gestational Age in Days:  1





Discharge Summary-Simple/Stand


Procedures


Spontaneous vaginal delivery


Discharge Physical Examination


Allergies:  


Coded Allergies:  


     coconut (Verified  Allergy, Severe, ANAPHYLAXIS, 18)


Vitals & I&Os





Vital Sign - Last 12Hours








  Date Time  Temp Pulse Resp B/P (MAP) Pulse Ox O2 Delivery O2 Flow Rate FiO2


 


18 06:15 97.7 53 18 114/71 (85) 97 Room Air  








General Appearance:  Alert, No Acute Distress


Respiratory:  Clear to Auscultation, Normal Air Movement


Cardiovascular:  Regular Rate, No Murmurs


Abdominal:  Other (fundus firm at umbilicus)


Neuro:  Normal Speech


Psych/Mental Status:  Mental Status NL





Hospital Course


See final discharge diagnosis.


Labs





Laboratory Tests








Test


 18


13:00 18


13:25 18


05:32 Range/Units


 


 


Urine Color YELLOW     


 


Urine Clarity CLEAR     


 


Urine pH 5    5-9  


 


Urine Specific Gravity 1.025 H   1.016-1.022  


 


Urine Protein 1+ H   NEGATIVE  


 


Urine Glucose (UA) NEGATIVE    NEGATIVE  


 


Urine Ketones NEGATIVE    NEGATIVE  


 


Urine Nitrite NEGATIVE    NEGATIVE  


 


Urine Bilirubin NEGATIVE    NEGATIVE  


 


Urine Urobilinogen NORMAL    NORMAL  MG/DL


 


Urine Leukocyte Esterase 1+ H   NEGATIVE  


 


Urine RBC (Auto) 5+ H   NEGATIVE  


 


Urine RBC NONE     /HPF


 


Urine WBC 2-5     /HPF


 


Urine Squamous Epithelial


Cells 10-25 H


 


 


  /HPF





 


Urine Crystals NONE     /LPF


 


Urine Bacteria TRACE     /HPF


 


Urine Casts NONE     /LPF


 


Urine Mucus MODERATE H    /LPF


 


Urine Culture Indicated NO     


 


White Blood Count


 


 8.4 


 11.2 H


 4.3-11.0


10^3/uL


 


Red Blood Count


 


 3.43 L


 3.13 L


 4.35-5.85


10^6/uL


 


Hemoglobin  10.4 L 9.7 L 11.5-16.0  G/DL


 


Hematocrit  31 L 29 L 35-52  %


 


Mean Corpuscular Volume  90  91  80-99  FL


 


Mean Corpuscular Hemoglobin  30  31  25-34  PG


 


Mean Corpuscular Hemoglobin


Concent 


 34 


 34 


 32-36  G/DL





 


Red Cell Distribution Width  14.6 H 14.2  10.0-14.5  %


 


Platelet Count


 


 317 


 269 


 130-400


10^3/uL


 


Mean Platelet Volume  8.7  9.3  7.4-10.4  FL


 


Neutrophils (%) (Auto)  70  73  42-75  %


 


Lymphocytes (%) (Auto)  24  19  12-44  %


 


Monocytes (%) (Auto)  6  7  0-12  %


 


Eosinophils (%) (Auto)  1  1  0-10  %


 


Basophils (%) (Auto)  0  0  0-10  %


 


Neutrophils # (Auto)  5.8  8.1 H 1.8-7.8  X 10^3


 


Lymphocytes # (Auto)  2.0  2.2  1.0-4.0  X 10^3


 


Monocytes # (Auto)  0.5  0.8  0.0-1.0  X 10^3


 


Eosinophils # (Auto)


 


 0.0 


 0.1 


 0.0-0.3


10^3/uL


 


Basophils # (Auto)


 


 0.0 


 0.0 


 0.0-0.1


10^3/uL











Discharge


Instructions to patient/family


Please see electronic discharge instructions given to patient.


Discharge Medications


Reviewed and agree with Discharge Medication list on patient's Discharge 

Instruction sheet





Clinical Quality Measures


DVT/VTE Risk/Contraindication:


Risk Factor Score Per Nursin


RFS Level Per Nursing on Admit:  2=Moderate











ANTELMO JOSEPH MD 2018 11:05 am Moderna dose 1 and 2